# Patient Record
Sex: MALE | Race: OTHER | Employment: STUDENT | URBAN - METROPOLITAN AREA
[De-identification: names, ages, dates, MRNs, and addresses within clinical notes are randomized per-mention and may not be internally consistent; named-entity substitution may affect disease eponyms.]

---

## 2024-03-15 ENCOUNTER — TELEPHONE (OUTPATIENT)
Dept: SPORTS MEDICINE | Facility: CLINIC | Age: 29
End: 2024-03-15

## 2024-03-15 NOTE — TELEPHONE ENCOUNTER
LVM for the Pt informing him of all the info he asked in the message.  I also informed him that we can go over these questions in a eval for consultation for this procedure. Provided a call back number for a possible appt.

## 2024-03-15 NOTE — TELEPHONE ENCOUNTER
----- Message from Jacquie Oates sent at 3/15/2024  1:46 PM CDT -----  Pt calling in regards to a  PRP injection , was calling to see if Dr Freedman offered this, and how much would it be , please call      Confirmed patient's contact info below:  Contact Name: Ramos Smart  Phone Number: [phone]